# Patient Record
Sex: MALE | NOT HISPANIC OR LATINO | Employment: FULL TIME | URBAN - METROPOLITAN AREA
[De-identification: names, ages, dates, MRNs, and addresses within clinical notes are randomized per-mention and may not be internally consistent; named-entity substitution may affect disease eponyms.]

---

## 2017-05-09 ENCOUNTER — OFFICE VISIT (OUTPATIENT)
Dept: FAMILY MEDICINE | Facility: OTHER | Age: 22
End: 2017-05-09

## 2017-05-09 VITALS
RESPIRATION RATE: 16 BRPM | BODY MASS INDEX: 32.77 KG/M2 | HEART RATE: 80 BPM | HEIGHT: 68 IN | DIASTOLIC BLOOD PRESSURE: 74 MMHG | SYSTOLIC BLOOD PRESSURE: 122 MMHG | TEMPERATURE: 98.1 F | WEIGHT: 216.2 LBS

## 2017-05-09 DIAGNOSIS — L03.031 PARONYCHIA OF GREAT TOE OF RIGHT FOOT: ICD-10-CM

## 2017-05-09 DIAGNOSIS — J45.20 ASTHMA, ALLERGIC, MILD INTERMITTENT, UNCOMPLICATED: Primary | ICD-10-CM

## 2017-05-09 PROCEDURE — 99214 OFFICE O/P EST MOD 30 MIN: CPT | Performed by: STUDENT IN AN ORGANIZED HEALTH CARE EDUCATION/TRAINING PROGRAM

## 2017-05-09 RX ORDER — CEPHALEXIN 500 MG/1
500 CAPSULE ORAL 3 TIMES DAILY
Qty: 21 CAPSULE | Refills: 0 | Status: SHIPPED | OUTPATIENT
Start: 2017-05-09 | End: 2017-05-16

## 2017-05-09 RX ORDER — ALBUTEROL SULFATE 90 UG/1
2 AEROSOL, METERED RESPIRATORY (INHALATION)
COMMUNITY
Start: 2015-10-21 | End: 2017-05-09

## 2017-05-09 RX ORDER — ALBUTEROL SULFATE 90 UG/1
2 AEROSOL, METERED RESPIRATORY (INHALATION) EVERY 6 HOURS PRN
Qty: 1 INHALER | Refills: 11 | Status: SHIPPED | OUTPATIENT
Start: 2017-05-09 | End: 2018-03-01

## 2017-05-09 NOTE — NURSING NOTE
"Chief Complaint   Patient presents with     Toenail     Asthma       Initial /74  Pulse 80  Temp 98.1  F (36.7  C) (Temporal)  Resp 16  Ht 5' 8\" (1.727 m)  Wt 216 lb 3.2 oz (98.1 kg)  BMI 32.87 kg/m2 Estimated body mass index is 32.87 kg/(m^2) as calculated from the following:    Height as of this encounter: 5' 8\" (1.727 m).    Weight as of this encounter: 216 lb 3.2 oz (98.1 kg).  Medication Reconciliation: complete   Danette Mendoza, SOLANGE    "

## 2017-05-09 NOTE — PROGRESS NOTES
"  SUBJECTIVE:                                                    Drake Wolff is a 21 year old male who presents to clinic today for the following health issues:    HPI    Concern - toenail     Onset: 2 months    Description:   Infection in right big toe  Left second toe was tender and had redness and swelling but this has now resolved.    Intensity: mild, moderate    Progression of Symptoms:  worsening    Accompanying Signs & Symptoms:  Puss, redness       Previous history of similar problem:   yes    Precipitating factors:   Worsened by: none    Alleviating factors:  Improved by: none       Therapies Tried and outcome: peroxide    Asthma Follow-Up    Was ACT completed today?    Yes    ACT Total Scores 5/9/2017   ACT TOTAL SCORE (Goal Greater than or Equal to 20) 20   In the past 12 months, how many times did you visit the emergency room for your asthma without being admitted to the hospital? 0   In the past 12 months, how many times were you hospitalized overnight because of your asthma? 0       Recent asthma triggers that patient is dealing with: dust mites, pollens and exercise or sports        Problem list and histories reviewed & adjusted, as indicated.  Additional history: as documented    ROS:  Constitutional, HEENT, cardiovascular, pulmonary, gi and gu systems are negative, except as otherwise noted.    OBJECTIVE:                                                    /74  Pulse 80  Temp 98.1  F (36.7  C) (Temporal)  Resp 16  Ht 5' 8\" (1.727 m)  Wt 216 lb 3.2 oz (98.1 kg)  BMI 32.87 kg/m2  Body mass index is 32.87 kg/(m^2).  GENERAL: healthy, alert and no distress  NECK: no adenopathy, no asymmetry, masses, or scars and thyroid normal to palpation  RESP: lungs clear to auscultation - no rales, rhonchi or wheezes  CV: regular rate and rhythm, normal S1 S2, no S3 or S4, no murmur, click or rub  MS: no gross musculoskeletal defects noted  SKIN: right great medial nail fold with erythema, mild " edema, tenderness to palpation, mild yellow crusting  NEURO: Normal strength and tone, mentation intact and speech normal  PSYCH: mentation appears normal, affect normal/bright    Diagnostic Test Results:  none      ASSESSMENT/PLAN:                                                      1. Asthma, allergic, mild intermittent, uncomplicated  Allergy induced asthma that flares seasonally. Asthma symptoms well-controlled with albuterol PRN.  Return to clinic as needed if experiences exacerbation.  ACT Total Scores 5/9/2017   ACT TOTAL SCORE (Goal Greater than or Equal to 20) 20   In the past 12 months, how many times did you visit the emergency room for your asthma without being admitted to the hospital? 0   In the past 12 months, how many times were you hospitalized overnight because of your asthma? 0     - albuterol (PROAIR HFA/PROVENTIL HFA/VENTOLIN HFA) 108 (90 BASE) MCG/ACT Inhaler; Inhale 2 puffs into the lungs every 6 hours as needed for shortness of breath / dyspnea or wheezing  Dispense: 1 Inhaler; Refill: 11    2. Paronychia of great toe of right foot  Antibiotic to treat paronychia. Discussed instructions for soaking toe and easing up the edge of the toenail with rolled cotton.  Podiatry consult for removal of ingrown toenail if conservative treatment does not relieve symptoms.  Soak foot every night in warm, soapy water    Soak the painful toe in warm water twice a day for 10 to 20 minutes each time. Wash the entire foot with an antibacterial soap.    If there is redness or swelling around the toenail, apply an antibiotic ointment three times a day.    Insert a small piece of rolled-up cotton under the corner of the nail to promote growth of the nail outward, away from the cuticle.    Wear shoes that do not put pressure on the toes, such as a sandal or open shoe. Closed shoes should be big enough in the toes so that there is no pressure on the painful toe.  You may use acetaminophen or ibuprofen for pain,  unless another pain medicine was prescribed. Talk with your healthcare provider before using these medicines if you have chronic liver or kidney disease. Also tell your healthcare provider if you have ever had a stomach ulcer or GI bleeding.c.    - cephALEXin (KEFLEX) 500 MG capsule; Take 1 capsule (500 mg) by mouth 3 times daily for 7 days  Dispense: 21 capsule; Refill: 0    DEVANTE Galdamez [facility]

## 2017-05-09 NOTE — MR AVS SNAPSHOT
After Visit Summary   5/9/2017    Drake Wolff    MRN: [id number]           Patient Information     Date Of Birth          1995        Visit Information        Provider Department      5/9/2017 2:40 PM Gladys Kelly APRN [facility]        Today's Diagnoses     Paronychia of great toe of right foot    -  1      Care Instructions    Keflex three times daily for 7 days.  Soak foot every night in warm, soapy water    Soak the painful toe in warm water twice a day for 10 to 20 minutes each time. Wash the entire foot with an antibacterial soap.    If there is redness or swelling around the toenail, apply an antibiotic ointment three times a day.    Insert a small piece of rolled-up cotton under the corner of the nail to promote growth of the nail outward, away from the cuticle.    Wear shoes that do not put pressure on the toes, such as a sandal or open shoe. Closed shoes should be big enough in the toes so that there is no pressure on the painful toe.    You may use acetaminophen or ibuprofen for pain, unless another pain medicine was prescribed. Talk with your healthcare provider before using these medicines if you have chronic liver or kidney disease. Also tell your healthcare provider if you have ever had a stomach ulcer or GI bleeding.  Gladys Kelly, NP-C  [phone]  Ingrown Toenail, Infected (Antibiotics, No Excision)  An ingrown toenail occurs when the nail grows sideways into the skin alongside the nail. This can cause pain. It can also lead to an infection with redness, swelling and sometimes drainage.  Cause  The most common cause of an ingrown toenail is trimming your nails wrong. Most people trim the nails too close to the skin and try to round the nail too tightly around the shape of the toe. When you do this, the nail can grow into the skin of your toe. While it may look nice, your toenail can grow into the skin and cause an infection.  Other  causes include injury or wearing shoes that are too short or tight. This can cause the same problem that happens when trimming your nails. Your genetics can also make this more likely to happen.  Symptoms  The following are the most common symptoms of an ingrown toenail:     Pain    Redness    Swelling    Drainage  Treatment  The best thing to do for an ingrown toenail is treat it as soon as you see there is a problem. The longer you wait to do something, the worse it is likely to get. Sometimes it gets worse quickly, other times it may take awhile. It can even feel better for a while, and then get worse.  Inflammation  If the infection is mild, you may be able to take care of it at home with the following measures:    Frequent warm water soaks    Keeping it clean    Wearing loose, comfortable shoes or sandals  Another method involves using a small piece of cotton or waxed dental floss to gently lift up the corner of the problem nail. Change the cotton or floss frequently, especially if it gets dirty.  Infection  If your infection is mild, and home care isn't working, or if the infection gets worse, see your health care provider. Signs of worsening infection include:    Swelling    Redness    Pus drainage  In some cases, you may need antibiotics along with warm soaks. If after 2 to 3 days of antibiotic  the toenail doesn't get better or gets worse, part of the nail may need to be removed to drain the infection. With treatment, it can take 1 to 2 weeks to clear up completely.  Home care  Wound care  For the next 3 days, soak and clean your toe in warm water a few times a day.  1) Twice a day for the first 3 days, clean and soak the toe as follows:    Soak your foot in a tub of warm water for 5 minutes. Or, hold your toe under a faucet of warm running water for 5 minutes.    Clean any remaining crust away with soap and water using a cotton swab.    Put a small amount of antibiotic ointment on the infected area.  2)  Change the dressing or bandage every time you soak or clean it, or whenever it becomes wet or dirty.  3) If you were prescribed antibiotics, take them as directed until they are all gone.  4) Wear comfortable shoes with a lot of toe room, or open-toe sandals, while your toe is healing.  Medications    You can take acetaminophen or ibuprofen for pain, unless you were given a different pain medicine to use. If you have chronic liver or kidney disease, ever had a stomach ulcer or gastrointestinal bleeding, or are taking blood thinner medications, talk with your doctor before using these medicines.    If you were given antibiotics, take them until they are used up or your doctor tells you to stop, even if the wound looks better.  Prevention  To prevent ingrown toenails:  1) Wear shoes that fit well. Avoid shoes that pinch the toes together.  2) When you trim your toenails, do not cut them too short. Cut straight across at the top and do not round the edges.  3) Do not use a sharp object to clean under your nail since this might cause an infection.  4) If the toenail starts to grow into the skin again, put a small piece of waxed dental floss or cotton under that side of the nail to help it grow out straight.  Follow-up care  Follow up with your doctor or this facility as advised by our staff.  When to seek medical care  Get prompt medical attention if any of the following occur:    Increasing redness, pain or swelling of the toe    Red streaks in the skin leading away from the wound    Pus or fluid drainage    Fever of 100.4  F (38  C) or higher, or as directed by your health care provider    0787-6727 [employer]. [address] [place] [address], [place] [address]. All rights reserved. This information is not intended as a substitute for professional medical care. Always follow your healthcare professional's instructions.                     Follow-ups after your visit        Who to contact     If you have  "questions or need follow up information about today's clinic visit or your schedule please contact [facility] directly at [phone].  Normal or non-critical lab and imaging results will be communicated to you by MyChart, letter or phone within 4 business days after the clinic has received the results. If you do not hear from us within 7 days, please contact the clinic through MyChart or phone. If you have a critical or abnormal lab result, we will notify you by phone as soon as possible.  Submit refill requests through [employer] or call your pharmacy and they will forward the refill request to us. Please allow 3 business days for your refill to be completed.          Additional Information About Your Visit        [employer] Information     [employer] lets you send messages to your doctor, view your test results, renew your prescriptions, schedule appointments and more. To sign up, go to [email or web address] . Click on \"Log in\" on the left side of the screen, which will take you to the Welcome page. Then click on \"Sign up Now\" on the right side of the page.     You will be asked to enter the access code listed below, as well as some personal information. Please follow the directions to create your username and password.     Your access code is: [email or web address]  Expires: 2017  3:26 PM     Your access code will  in 90 days. If you need help or a new code, please call your [place] clinic or [phone].        Care EveryWhere ID     This is your Care EveryWhere ID. This could be used by other organizations to access your [place] medical records  [id number]        Your Vitals Were     Pulse Temperature Respirations Height BMI (Body Mass Index)       80 98.1  F (36.7  C) (Temporal) 16 5' 8\" (1.727 m) 32.87 kg/m2        Blood Pressure from Last 3 Encounters:   17 122/74    Weight from Last 3 Encounters:   17 216 lb 3.2 oz (98.1 kg)              Today, you had the following     No " orders found for display         Today's Medication Changes          These changes are accurate as of: 5/9/17  3:27 PM.  If you have any questions, ask your nurse or doctor.               Start taking these medicines.        Dose/Directions    cephALEXin 500 MG capsule   Commonly known as:  KEFLEX   Used for:  Paronychia of great toe of right foot   Started by:  Gladys Kelly APRN CNP        Dose:  500 mg   Take 1 capsule (500 mg) by mouth 3 times daily for 7 days   Quantity:  21 capsule   Refills:  0         These medicines have changed or have updated prescriptions.        Dose/Directions    albuterol 108 (90 BASE) MCG/ACT Inhaler   Commonly known as:  PROAIR HFA/PROVENTIL HFA/VENTOLIN HFA   This may have changed:    - when to take this  - reasons to take this   Used for:  Paronychia of great toe of right foot   Changed by:  Gladys Kelly APRN CNP        Dose:  2 puff   Inhale 2 puffs into the lungs every 6 hours as needed for shortness of breath / dyspnea or wheezing   Quantity:  1 Inhaler   Refills:  11            Where to get your medicines      These medications were sent to [id number] - [place], [place] - [address] [place] [id number]     Phone:  [phone]     albuterol 108 (90 BASE) MCG/ACT Inhaler    cephALEXin 500 MG capsule                Primary Care Provider Office Phone # Fax #    DEVANTE Emerson [phone]       [address]  [facility] [address]        Thank you!     Thank you for choosing [facility]  for your care. Our goal is always to provide you with excellent care. Hearing back from our patients is one way we can continue to improve our services. Please take a few minutes to complete the written survey that you may receive in the mail after your visit with us. Thank you!             Your Updated Medication List - Protect others around you: Learn  how to safely use, store and throw away your medicines at www.disposemymeds.org.          This list is accurate as of: 5/9/17  3:27 PM.  Always use your most recent med list.                   Brand Name Dispense Instructions for use    albuterol 108 (90 BASE) MCG/ACT Inhaler    PROAIR HFA/PROVENTIL HFA/VENTOLIN HFA    1 Inhaler    Inhale 2 puffs into the lungs every 6 hours as needed for shortness of breath / dyspnea or wheezing       cephALEXin 500 MG capsule    KEFLEX    21 capsule    Take 1 capsule (500 mg) by mouth 3 times daily for 7 days       ZYRTEC-D PO

## 2017-05-09 NOTE — PATIENT INSTRUCTIONS
Keflex three times daily for 7 days.  Soak foot every night in warm, soapy water    Soak the painful toe in warm water twice a day for 10 to 20 minutes each time. Wash the entire foot with an antibacterial soap.    If there is redness or swelling around the toenail, apply an antibiotic ointment three times a day.    Insert a small piece of rolled-up cotton under the corner of the nail to promote growth of the nail outward, away from the cuticle.    Wear shoes that do not put pressure on the toes, such as a sandal or open shoe. Closed shoes should be big enough in the toes so that there is no pressure on the painful toe.    You may use acetaminophen or ibuprofen for pain, unless another pain medicine was prescribed. Talk with your healthcare provider before using these medicines if you have chronic liver or kidney disease. Also tell your healthcare provider if you have ever had a stomach ulcer or GI bleeding.  Gladys Kelly, NP-C  [phone]  Ingrown Toenail, Infected (Antibiotics, No Excision)  An ingrown toenail occurs when the nail grows sideways into the skin alongside the nail. This can cause pain. It can also lead to an infection with redness, swelling and sometimes drainage.  Cause  The most common cause of an ingrown toenail is trimming your nails wrong. Most people trim the nails too close to the skin and try to round the nail too tightly around the shape of the toe. When you do this, the nail can grow into the skin of your toe. While it may look nice, your toenail can grow into the skin and cause an infection.  Other causes include injury or wearing shoes that are too short or tight. This can cause the same problem that happens when trimming your nails. Your genetics can also make this more likely to happen.  Symptoms  The following are the most common symptoms of an ingrown toenail:     Pain    Redness    Swelling    Drainage  Treatment  The best thing to do for an ingrown toenail is treat it as soon  as you see there is a problem. The longer you wait to do something, the worse it is likely to get. Sometimes it gets worse quickly, other times it may take awhile. It can even feel better for a while, and then get worse.  Inflammation  If the infection is mild, you may be able to take care of it at home with the following measures:    Frequent warm water soaks    Keeping it clean    Wearing loose, comfortable shoes or sandals  Another method involves using a small piece of cotton or waxed dental floss to gently lift up the corner of the problem nail. Change the cotton or floss frequently, especially if it gets dirty.  Infection  If your infection is mild, and home care isn't working, or if the infection gets worse, see your health care provider. Signs of worsening infection include:    Swelling    Redness    Pus drainage  In some cases, you may need antibiotics along with warm soaks. If after 2 to 3 days of antibiotic  the toenail doesn't get better or gets worse, part of the nail may need to be removed to drain the infection. With treatment, it can take 1 to 2 weeks to clear up completely.  Home care  Wound care  For the next 3 days, soak and clean your toe in warm water a few times a day.  1) Twice a day for the first 3 days, clean and soak the toe as follows:    Soak your foot in a tub of warm water for 5 minutes. Or, hold your toe under a faucet of warm running water for 5 minutes.    Clean any remaining crust away with soap and water using a cotton swab.    Put a small amount of antibiotic ointment on the infected area.  2) Change the dressing or bandage every time you soak or clean it, or whenever it becomes wet or dirty.  3) If you were prescribed antibiotics, take them as directed until they are all gone.  4) Wear comfortable shoes with a lot of toe room, or open-toe sandals, while your toe is healing.  Medications    You can take acetaminophen or ibuprofen for pain, unless you were given a different pain  medicine to use. If you have chronic liver or kidney disease, ever had a stomach ulcer or gastrointestinal bleeding, or are taking blood thinner medications, talk with your doctor before using these medicines.    If you were given antibiotics, take them until they are used up or your doctor tells you to stop, even if the wound looks better.  Prevention  To prevent ingrown toenails:  1) Wear shoes that fit well. Avoid shoes that pinch the toes together.  2) When you trim your toenails, do not cut them too short. Cut straight across at the top and do not round the edges.  3) Do not use a sharp object to clean under your nail since this might cause an infection.  4) If the toenail starts to grow into the skin again, put a small piece of waxed dental floss or cotton under that side of the nail to help it grow out straight.  Follow-up care  Follow up with your doctor or this facility as advised by our staff.  When to seek medical care  Get prompt medical attention if any of the following occur:    Increasing redness, pain or swelling of the toe    Red streaks in the skin leading away from the wound    Pus or fluid drainage    Fever of 100.4  F (38  C) or higher, or as directed by your health care provider    [id number] [employer]. [address] [place] [address], [place] [address]. All rights reserved. This information is not intended as a substitute for professional medical care. Always follow your healthcare professional's instructions.

## 2017-05-10 ASSESSMENT — ASTHMA QUESTIONNAIRES: ACT_TOTALSCORE: 20

## 2017-05-13 PROBLEM — J45.20 ASTHMA, ALLERGIC, MILD INTERMITTENT, UNCOMPLICATED: Status: ACTIVE | Noted: 2017-05-13

## 2017-06-01 ENCOUNTER — TELEPHONE (OUTPATIENT)
Dept: FAMILY MEDICINE | Facility: OTHER | Age: 22
End: 2017-06-01

## 2017-06-01 NOTE — TELEPHONE ENCOUNTER
Pt wants his rx for Albuteral transferred from the [facility] pharmacy in [place] to be sent to the [facility] in [place]. Due to insurance coverage.

## 2017-06-10 ENCOUNTER — HEALTH MAINTENANCE LETTER (OUTPATIENT)
Age: 22
End: 2017-06-10

## 2018-03-01 ENCOUNTER — OFFICE VISIT (OUTPATIENT)
Dept: FAMILY MEDICINE | Facility: CLINIC | Age: 23
End: 2018-03-01
Payer: COMMERCIAL

## 2018-03-01 VITALS
OXYGEN SATURATION: 99 % | HEIGHT: 68 IN | HEART RATE: 80 BPM | WEIGHT: 185 LBS | DIASTOLIC BLOOD PRESSURE: 64 MMHG | BODY MASS INDEX: 28.04 KG/M2 | TEMPERATURE: 98.3 F | SYSTOLIC BLOOD PRESSURE: 109 MMHG

## 2018-03-01 DIAGNOSIS — J45.20 ASTHMA, ALLERGIC, MILD INTERMITTENT, UNCOMPLICATED: Primary | ICD-10-CM

## 2018-03-01 PROCEDURE — 99213 OFFICE O/P EST LOW 20 MIN: CPT | Performed by: NURSE PRACTITIONER

## 2018-03-01 RX ORDER — ALBUTEROL SULFATE 90 UG/1
2 AEROSOL, METERED RESPIRATORY (INHALATION) EVERY 6 HOURS PRN
Qty: 1 INHALER | Refills: 11 | Status: SHIPPED | OUTPATIENT
Start: 2018-03-01

## 2018-03-01 NOTE — MR AVS SNAPSHOT
"              After Visit Summary   3/1/2018    Drake Wolff    MRN: [id number]           Patient Information     Date Of Birth          1995        Visit Information        Provider Department      3/1/2018 1:30 PM Georgie Gomes APRN CNP [facility] [place]        Today's Diagnoses     Asthma, allergic, mild intermittent, uncomplicated    -  1       Follow-ups after your visit        Who to contact     If you have questions or need follow up information about today's clinic visit or your schedule please contact [facility] directly at [phone].  Normal or non-critical lab and imaging results will be communicated to you by MyChart, letter or phone within 4 business days after the clinic has received the results. If you do not hear from us within 7 days, please contact the clinic through MyChart or phone. If you have a critical or abnormal lab result, we will notify you by phone as soon as possible.  Submit refill requests through [employer] or call your pharmacy and they will forward the refill request to us. Please allow 3 business days for your refill to be completed.          Additional Information About Your Visit        Care EveryWhere ID     This is your Care EveryWhere ID. This could be used by other organizations to access your [facility] medical records  [id number]        Your Vitals Were     Pulse Temperature Height Pulse Oximetry BMI (Body Mass Index)       80 98.3  F (36.8  C) (Oral) 5' 8\" (1.727 m) 99% 28.13 kg/m2        Blood Pressure from Last 3 Encounters:   03/01/18 109/64   05/09/17 122/74    Weight from Last 3 Encounters:   03/01/18 185 lb (83.9 kg)   05/09/17 216 lb 3.2 oz (98.1 kg)              Today, you had the following     No orders found for display         Where to get your medicines      These medications were sent to [facility] [place], [place] - [address], [address]  [address], [address], [place] [address]     Phone:  " [phone]     albuterol 108 (90 BASE) MCG/ACT Inhaler          Primary Care Provider Office Phone # Fax #    DEVANTE [facility] [phone] [address] [address] [place] [address]        Equal Access to Services     YOEL BLACKWOOD : Hadii aad ku hadasho Soomaali, waaxda luqadaha, qaybta kaalmada adeegyada, waxay idiin hayaan adeeg kharash la'aan . So [facility] [phone].    ATENCIÓN: Si habla español, tiene a lerma disposición servicios gratuitos de asistencia lingüística. Llame al [phone].    We comply with applicable federal civil rights laws and [place] laws. We do not discriminate on the basis of race, color, national origin, age, disability, sex, sexual orientation, or gender identity.            Thank you!     Thank you for choosing [facility]  for your care. Our goal is always to provide you with excellent care. Hearing back from our patients is one way we can continue to improve our services. Please take a few minutes to complete the written survey that you may receive in the mail after your visit with us. Thank you!             Your Updated Medication List - Protect others around you: Learn how to safely use, store and throw away your medicines at www.disposemymeds.org.          This list is accurate as of 3/1/18  3:02 PM.  Always use your most recent med list.                   Brand Name Dispense Instructions for use Diagnosis    albuterol 108 (90 BASE) MCG/ACT Inhaler    PROAIR HFA/PROVENTIL HFA/VENTOLIN HFA    1 Inhaler    Inhale 2 puffs into the lungs every 6 hours as needed for shortness of breath / dyspnea or wheezing    Asthma, allergic, mild intermittent, uncomplicated

## 2018-03-01 NOTE — PROGRESS NOTES
"HPI      SUBJECTIVE:   Drake Wolff is a 22 year old male who presents to clinic today for the following health issues:      Asthma Follow-Up    Was ACT completed today?    Yes    ACT Total Scores 3/1/2018   ACT TOTAL SCORE (Goal Greater than or Equal to 20) 14   In the past 12 months, how many times did you visit the emergency room for your asthma without being admitted to the hospital? 3   In the past 12 months, how many times were you hospitalized overnight because of your asthma? 0       Recent asthma triggers that patient is dealing with: cold air      Requesting albuterol   Using inhaler 2-3 times a week  No specific provoking factors   4 ED visits in the past year for asthma. Just got 1 tx at each visit   Quit smoking 3 years ago   No cough, SOB currently   Just wants to have an inhaler on hand when he needs it      No past medical history on file.  No past surgical history on file.  Social History   Substance Use Topics     Smoking status: Former Smoker     Quit date: 1/1/2016     Smokeless tobacco: Never Used     Alcohol use Yes      Comment: occ     Current Outpatient Prescriptions   Medication Sig Dispense Refill     albuterol (PROAIR HFA/PROVENTIL HFA/VENTOLIN HFA) 108 (90 BASE) MCG/ACT Inhaler Inhale 2 puffs into the lungs every 6 hours as needed for shortness of breath / dyspnea or wheezing 1 Inhaler 11     [DISCONTINUED] albuterol (PROAIR HFA/PROVENTIL HFA/VENTOLIN HFA) 108 (90 BASE) MCG/ACT Inhaler Inhale 2 puffs into the lungs every 6 hours as needed for shortness of breath / dyspnea or wheezing 1 Inhaler 11     Allergies   Allergen Reactions     No Known Allergies        Reviewed and updated as needed this visit by clinical staff and provider      ROS  Detailed as above       /64 (BP Location: Right arm, Cuff Size: Adult Large)  Pulse 80  Temp 98.3  F (36.8  C) (Oral)  Ht 5' 8\" (1.727 m)  Wt 185 lb (83.9 kg)  SpO2 99%  BMI 28.13 kg/m2    Physical Exam   Constitutional: He is " well-developed, well-nourished, and in no distress.   HENT:   Head: Normocephalic.   Eyes: Conjunctivae are normal.   Pulmonary/Chest: Effort normal.   Neurological: He is alert.   Psychiatric: Mood and affect normal.   Vitals reviewed.      Assessment and Plan:       ICD-10-CM    1. Asthma, allergic, mild intermittent, uncomplicated J45.20 albuterol (PROAIR HFA/PROVENTIL HFA/VENTOLIN HFA) 108 (90 BASE) MCG/ACT Inhaler       Intermittent asthma. Here only to request a refill on his inhaler. Doesn't feel he needs it currently. Strongly recommended establishing with PCP as he may need more maintenance. Pt agreed       DEVANTE Vega, CNP  [facility]

## 2018-03-01 NOTE — NURSING NOTE
"Chief Complaint   Patient presents with     Asthma     f/u       Initial /64 (BP Location: Right arm, Cuff Size: Adult Large)  Pulse 80  Temp 98.3  F (36.8  C) (Oral)  Ht 5' 8\" (1.727 m)  Wt 185 lb (83.9 kg)  SpO2 99%  BMI 28.13 kg/m2 Estimated body mass index is 28.13 kg/(m^2) as calculated from the following:    Height as of this encounter: 5' 8\" (1.727 m).    Weight as of this encounter: 185 lb (83.9 kg).  Medication Reconciliation: complete       Yojana Alejandra CMA      "

## 2018-03-02 ASSESSMENT — ASTHMA QUESTIONNAIRES: ACT_TOTALSCORE: 14

## 2018-03-03 ENCOUNTER — HEALTH MAINTENANCE LETTER (OUTPATIENT)
Age: 23
End: 2018-03-03

## 2018-04-11 ENCOUNTER — APPOINTMENT (OUTPATIENT)
Dept: GENERAL RADIOLOGY | Facility: CLINIC | Age: 23
End: 2018-04-11
Attending: EMERGENCY MEDICINE
Payer: COMMERCIAL

## 2018-04-11 ENCOUNTER — HOSPITAL ENCOUNTER (EMERGENCY)
Facility: CLINIC | Age: 23
Discharge: HOME | End: 2018-04-11
Attending: EMERGENCY MEDICINE | Admitting: EMERGENCY MEDICINE
Payer: COMMERCIAL

## 2018-04-11 VITALS
RESPIRATION RATE: 22 BRPM | TEMPERATURE: 99.1 F | OXYGEN SATURATION: 96 % | DIASTOLIC BLOOD PRESSURE: 82 MMHG | HEART RATE: 114 BPM | SYSTOLIC BLOOD PRESSURE: 134 MMHG

## 2018-04-11 DIAGNOSIS — J45.901 MODERATE ASTHMA WITH EXACERBATION, UNSPECIFIED WHETHER PERSISTENT: ICD-10-CM

## 2018-04-11 LAB — INTERPRETATION ECG - MUSE: NORMAL

## 2018-04-11 PROCEDURE — 71046 X-RAY EXAM CHEST 2 VIEWS: CPT

## 2018-04-11 PROCEDURE — 93005 ELECTROCARDIOGRAM TRACING: CPT

## 2018-04-11 PROCEDURE — 25000125 ZZHC RX 250: Performed by: EMERGENCY MEDICINE

## 2018-04-11 PROCEDURE — 99284 EMERGENCY DEPT VISIT MOD MDM: CPT | Mod: 25

## 2018-04-11 PROCEDURE — 94640 AIRWAY INHALATION TREATMENT: CPT

## 2018-04-11 RX ORDER — ALBUTEROL SULFATE 0.83 MG/ML
5 SOLUTION RESPIRATORY (INHALATION) ONCE
Status: COMPLETED | OUTPATIENT
Start: 2018-04-11 | End: 2018-04-11

## 2018-04-11 RX ORDER — ALBUTEROL SULFATE 0.83 MG/ML
1 SOLUTION RESPIRATORY (INHALATION) EVERY 6 HOURS PRN
Qty: 75 ML | Refills: 0 | Status: SHIPPED | OUTPATIENT
Start: 2018-04-11

## 2018-04-11 RX ORDER — PREDNISONE 20 MG/1
TABLET ORAL
Qty: 10 TABLET | Refills: 0 | Status: SHIPPED | OUTPATIENT
Start: 2018-04-11 | End: 2021-10-11

## 2018-04-11 RX ADMIN — ALBUTEROL SULFATE 5 MG: 2.5 SOLUTION RESPIRATORY (INHALATION) at 08:55

## 2018-04-11 ASSESSMENT — ENCOUNTER SYMPTOMS
SHORTNESS OF BREATH: 1
CHEST TIGHTNESS: 1
WHEEZING: 1
FEVER: 0
COUGH: 1

## 2018-04-11 NOTE — ED AVS SNAPSHOT
Emergency Department    [address] [facility] [address]    Phone:  [phone]    Fax:  [phone]                                       Drake Wolff   MRN: [id number]    Department:   Emergency Department   Date of Visit:  4/11/2018           After Visit Summary Signature Page     I have received my discharge instructions, and my questions have been answered. I have discussed any challenges I see with this plan with the nurse or doctor.    ..........................................................................................................................................  Patient/Patient Representative Signature      ..........................................................................................................................................  Patient Representative Print Name and Relationship to Patient    ..................................................               ................................................  Date                                            Time    ..........................................................................................................................................  Reviewed by Signature/Title    ...................................................              ..............................................  Date                                                            Time

## 2018-04-11 NOTE — ED AVS SNAPSHOT
Emergency Department    [address] [place] [address]    Phone:  [phone]    Fax:  [phone]                                       Drake Wolff   MRN: [id number]    Department:   Emergency Department   Date of Visit:  4/11/2018           Patient Information     Date Of Birth          1995        Your diagnoses for this visit were:     Moderate asthma with exacerbation, unspecified whether persistent        You were seen by Randell Mckeon MD.      Follow-up Information     Please follow up.    Why:  use the nebulized albuterol or your inhaler - take the Prednisone - return if any concerns        Discharge Instructions       Discharge Instructions  Asthma    Asthma is a condition causing narrowing and inflammation of the airways that can make it hard to breathe.  Asthma can also cause cough, wheezing, noisy breathing and tightness in the chest.  Asthma can be brought on or  triggered  by many things, including dust, mold, pollen, cigarette smoke, exercise, stress and infections (like the common cold).     Generally, every Emergency Department visit should have a follow-up clinic visit with either a primary or a specialty clinic/provider. Please follow-up as instructed by your emergency provider today.    Return to the Emergency Department if:    Your breathing gets worse.    You need to use your inhaler more often than every 4 hours, or cannot get relief from your inhaler.    You are very weak, or feel much more ill.    You develop new symptoms, such as chest pain.    You cough up blood.    You are vomiting (throwing up) enough that you cannot keep fluids or your medicine down.    What can I do to help myself?    Fill any prescriptions the provider gave you and take them right away--especially antibiotics. Be sure to finish the whole antibiotic prescription.    You may be given a prescription for an inhaler, which can help loosen tight air passages.  Use this as needed, but  not more often than directed. Inhalers work much better when used with a spacer.     You may be given a prescription for a steroid to reduce inflammation. Used long-term, these can have some side effects, but for short-term use they are safe. You may notice restlessness or increased appetite (eating more).        You may use non-prescription cough or cold medicines. Cough medicines may help, but do not make the cough go away completely.     Avoid smoke, because this can make you feel worse. If you smoke, this may be a good time to quit! Consider using nicotine lozenges, gum, or patches to reduce cravings.     If you have a fever, Tylenol  (acetaminophen), Motrin  (ibuprofen), or Advil  (ibuprofen), may help bring fever down and may help you feel more comfortable. Be sure to read and follow the package directions, and ask your provider if you have questions.    Be sure to get your flu shot each year.  For certain age groups, the pneumonia shot can help prevent pneumonia.  It is important that you follow up with your regular provider, to be sure that you are improving from this spell (an acute asthma exacerbation), and also to do what you can to keep from having trouble again. Sometimes you need long-term medicines to keep your asthma under control.   If you were given a prescription for medicine here today, be sure to read all of the information (including the package insert) that comes with your prescription.  This will include important information about the medicine, its side effects, and any warnings that you need to know about.  The pharmacist who fills the prescription can provide more information and answer questions you may have about the medicine.  If you have questions or concerns that the pharmacist cannot address, please call or return to the Emergency Department.   Remember that you can always come back to the Emergency Department if you are not able to see your regular provider in the amount of time  listed above, if you get any new symptoms, or if there is anything that worries you.      24 Hour Appointment Hotline       To make an appointment at any [facility], call [phone] ([phone]). If you don't have a family doctor or clinic, we will help you find one. [facility] clinics are conveniently located to serve the needs of you and your family.             Review of your medicines      START taking        Dose / Directions Last dose taken    predniSONE 20 MG tablet   Commonly known as:  DELTASONE   Quantity:  10 tablet        Take two tablets (= 40mg) each day for 5 (five) days   Refills:  0          CONTINUE these medicines which may have CHANGED, or have new prescriptions. If we are uncertain of the size of tablets/capsules you have at home, strength may be listed as something that might have changed.        Dose / Directions Last dose taken    * albuterol 108 (90 Base) MCG/ACT Inhaler   Commonly known as:  PROAIR HFA/PROVENTIL HFA/VENTOLIN HFA   Dose:  2 puff   What changed:  Another medication with the same name was added. Make sure you understand how and when to take each.   Quantity:  1 Inhaler        Inhale 2 puffs into the lungs every 6 hours as needed for shortness of breath / dyspnea or wheezing   Refills:  11        * albuterol (2.5 MG/3ML) 0.083% neb solution   Dose:  1 vial   What changed:  You were already taking a medication with the same name, and this prescription was added. Make sure you understand how and when to take each.   Quantity:  75 mL        Take 1 vial (2.5 mg) by nebulization every 6 hours as needed for shortness of breath / dyspnea or wheezing   Refills:  0        * Notice:  This list has 2 medication(s) that are the same as other medications prescribed for you. Read the directions carefully, and ask your doctor or other care provider to review them with you.            Prescriptions were sent or printed at these locations (2 Prescriptions)                   [id number] - [place], [place] - [address]   [address], [place] [address]    Telephone:  [phone]   Fax:  [phone]   Hours:                  E-Prescribed (2 of 2)         albuterol (2.5 MG/3ML) 0.083% neb solution               predniSONE (DELTASONE) 20 MG tablet                Procedures and tests performed during your visit     Chest XR,  PA & LAT    EKG 12 lead      Orders Needing Specimen Collection     None      Pending Results     No orders found from 4/9/2018 to 4/12/2018.            Pending Culture Results     No orders found from 4/9/2018 to 4/12/2018.            Pending Results Instructions     If you had any lab results that were not finalized at the time of your Discharge, you can call the ED Lab Result RN at [phone]. You will be contacted by this team for any positive Lab results or changes in treatment. The nurses are available 7 days a week from 10A to 6:30P.  You can leave a message 24 hours per day and they will return your call.        Test Results From Your Hospital Stay        4/11/2018  9:44 AM      Narrative     CHEST TWO VIEWS  4/11/2018 9:39 AM     HISTORY: Left chest pain.    COMPARISON: None.        Impression     IMPRESSION: Normal.    BETTY MORA MD                Clinical Quality Measure: Blood Pressure Screening     Your blood pressure was checked while you were in the emergency department today. The last reading we obtained was  BP: 134/82 . Please read the guidelines below about what these numbers mean and what you should do about them.  If your systolic blood pressure (the top number) is less than 120 and your diastolic blood pressure (the bottom number) is less than 80, then your blood pressure is normal. There is nothing more that you need to do about it.  If your systolic blood pressure (the top number) is 120-139 or your diastolic blood pressure (the bottom number) is 80-89, your blood pressure may be higher than it should be. You should  have your blood pressure rechecked within a year by a primary care provider.  If your systolic blood pressure (the top number) is 140 or greater or your diastolic blood pressure (the bottom number) is 90 or greater, you may have high blood pressure. High blood pressure is treatable, but if left untreated over time it can put you at risk for heart attack, stroke, or kidney failure. You should have your blood pressure rechecked by a primary care provider within the next 4 weeks.  If your provider in the emergency department today gave you specific instructions to follow-up with your doctor or provider even sooner than that, you should follow that instruction and not wait for up to 4 weeks for your follow-up visit.        Thank you for choosing [facility]       Thank you for choosing [facility] for your care. Our goal is always to provide you with excellent care. Hearing back from our patients is one way we can continue to improve our services. Please take a few minutes to complete the written survey that you may receive in the mail after you visit with us. Thank you!        Care EveryWhere ID     This is your Care EveryWhere ID. This could be used by other organizations to access your [facility] medical records  [id number]        Equal Access to Services     YOEL BLACKWOOD : Hadyolie Monsalve, jacqueline wills, evan burch. So [facility] [phone].    ATENCIÓN: Si habla español, tiene a lerma disposición servicios gratuitos de asistencia lingüística. Joey al [phone].    We comply with applicable federal civil rights laws and [place] laws. We do not discriminate on the basis of race, color, national origin, age, disability, sex, sexual orientation, or gender identity.            After Visit Summary       This is your record. Keep this with you and show to your community pharmacist(s) and doctor(s) at your next visit.

## 2018-11-20 ENCOUNTER — TELEPHONE (OUTPATIENT)
Dept: FAMILY MEDICINE | Facility: OTHER | Age: 23
End: 2018-11-20

## 2018-11-20 NOTE — LETTER
[address]   [facility] [address]  Phone: [phone]  December 4, 2018      Drake Wolff  [address] [place] [address]      Dear Drake,    We care about your health and have reviewed your health plan including your medical conditions, medications, and lab results.  Based on this review, it is recommended that you follow up regarding the following health topic(s):  -Asthma    We recommend you take the following action(s):  -schedule a FOLLOWUP APPOINTMENT.   -Complete and return the attached ASTHMA CONTROL TEST.  If your total score is 19 or less or you have been to the ER or urgent care for your asthma, then please schedule an asthma followup appointment.     Please call us at the [facility] - [phone] (or use [employer]) to address the above recommendations.     Thank you for trusting [facility] and we appreciate the opportunity to serve you.  We look forward to supporting your healthcare needs in the future.    Healthy Regards,    Your Health Care Team  [facility]

## 2018-11-20 NOTE — TELEPHONE ENCOUNTER
Summary:    Patient is due/failing the following:   Establish care, Asthma follow up and ACT    Action needed:   Patient needs office visit for follow up. and Patient needs to do ACT.    Type of outreach:    Phone, left message for patient to call back.     Questions for provider review:    None                                                                                                                                    Delilah Christina       Chart routed to Care Team .      Panel Management Review      Patient has the following on his problem list:     Asthma review     ACT Total Scores 3/1/2018   ACT TOTAL SCORE (Goal Greater than or Equal to 20) 14   In the past 12 months, how many times did you visit the emergency room for your asthma without being admitted to the hospital? 3   In the past 12 months, how many times were you hospitalized overnight because of your asthma? 0      1. Is Asthma diagnosis on the Problem List? Yes    2. Is Asthma listed on Health Maintenance? Yes    3. Patient is due for:  ACT and AAP      Composite cancer screening  Chart review shows that this patient is due/due soon for the following None

## 2019-03-21 ENCOUNTER — TELEPHONE (OUTPATIENT)
Dept: FAMILY MEDICINE | Facility: OTHER | Age: 24
End: 2019-03-21

## 2019-03-21 NOTE — LETTER
[address]   [facility] [address]  Phone: [phone]  March 21, 2019      Drake Wolff  [address] [place] [address]      Dear Drake,    We care about your health and have reviewed your health plan including your medical conditions, medications, and lab results.  Based on this review, it is recommended that you follow up regarding the following health topic(s):  -Asthma  -Wellness (Physical) Visit     We recommend you take the following action(s):  -schedule a FOLLOWUP APPOINTMENT.   -Complete and return the attached ASTHMA CONTROL TEST.  If your total score is 19 or less or you have been to the ER or urgent care for your asthma, then please schedule an asthma followup appointment.     Please call us at the [facility] - [phone] (or use [employer]) to address the above recommendations.     Thank you for trusting [facility] and we appreciate the opportunity to serve you.  We look forward to supporting your healthcare needs in the future.    Healthy Regards,    Your Health Care Team  [facility]

## 2019-03-21 NOTE — TELEPHONE ENCOUNTER
Summary:    Patient is due/failing the following:   Establish care with PCP, ACT and PHYSICAL    Action needed:   Patient needs office visit for Physical and asthma follow up. and Patient needs to do ACT.    Type of outreach:    Sent letter.    Questions for provider review:    None                                                                                                                                    Delilah Vasquez       Chart routed to Care Team .          Panel Management Review      Patient has the following on his problem list:     Asthma review     ACT Total Scores 3/1/2018   ACT TOTAL SCORE (Goal Greater than or Equal to 20) 14   In the past 12 months, how many times did you visit the emergency room for your asthma without being admitted to the hospital? 3   In the past 12 months, how many times were you hospitalized overnight because of your asthma? 0      1. Is Asthma diagnosis on the Problem List? Yes    2. Is Asthma listed on Health Maintenance? Yes    3. Patient is due for:  ACT      Composite cancer screening  Chart review shows that this patient is due/due soon for the following None

## 2019-05-11 ENCOUNTER — HOSPITAL ENCOUNTER (EMERGENCY)
Facility: CLINIC | Age: 24
Discharge: HOME | End: 2019-05-11
Attending: EMERGENCY MEDICINE | Admitting: EMERGENCY MEDICINE

## 2019-05-11 VITALS
HEIGHT: 68 IN | TEMPERATURE: 98.2 F | BODY MASS INDEX: 28.04 KG/M2 | OXYGEN SATURATION: 99 % | DIASTOLIC BLOOD PRESSURE: 73 MMHG | WEIGHT: 185 LBS | RESPIRATION RATE: 16 BRPM | SYSTOLIC BLOOD PRESSURE: 127 MMHG | HEART RATE: 87 BPM

## 2019-05-11 DIAGNOSIS — M25.511 ACUTE PAIN OF RIGHT SHOULDER: ICD-10-CM

## 2019-05-11 PROCEDURE — 99282 EMERGENCY DEPT VISIT SF MDM: CPT

## 2019-05-11 ASSESSMENT — ENCOUNTER SYMPTOMS
NECK PAIN: 1
NUMBNESS: 0
MYALGIAS: 1
SHORTNESS OF BREATH: 0

## 2019-05-11 ASSESSMENT — MIFFLIN-ST. JEOR: SCORE: 1813.65

## 2019-05-11 NOTE — LETTER
May 11, 2019      To Whom It May Concern:      Drake Wolff was seen in our Emergency Department today, 05/11/19. He may return to work 5/12/19.    Sincerely,        Sabina Garcia MD

## 2019-05-11 NOTE — ED AVS SNAPSHOT
Emergency Department  [address] [facility] [address]  Phone:  [phone]  Fax:  [phone]                                    Drake Wolff   MRN: [id number]    Department:   Emergency Department   Date of Visit:  5/11/2019           After Visit Summary Signature Page    I have received my discharge instructions, and my questions have been answered. I have discussed any challenges I see with this plan with the nurse or doctor.    ..........................................................................................................................................  Patient/Patient Representative Signature      ..........................................................................................................................................  Patient Representative Print Name and Relationship to Patient    ..................................................               ................................................  Date                                   Time    ..........................................................................................................................................  Reviewed by Signature/Title    ...................................................              ..............................................  Date                                               Time          22EPIC Rev 08/18

## 2019-05-11 NOTE — ED PROVIDER NOTES
History     Chief Complaint:  Arm Pain    The history is provided by the patient.      Drake Wolff is a 23 year old male with a history of asthma who presents to the emergency department with his wife for evaluation of arm pain. For the last four days, the patient reports experiencing intermittent sharp pain starting from his right axilla and moving up into the lower right part of his neck when he holds his arm or head a certain way. There are some episodes where the pain feels warm and like needles poking him on the right side of his body. The pain improves when he is at rest, but still has slight pain in his bicep. To note, the patient has had similar episodes to this on his left side with associated intense chest pain. He has been seen multiple times in the ED for it, where they have done ultra sounds, x-rays, and blood work, but the tests have all come back negative. He wanted to go to the urgency room for preventative measures, but they were closed, so he came to the emergency department for further evaluation.     He has a full range of motion, but reports some associated pain with movement. Patient denies any chest pain or shortness of breath. He has no numbness of tingly feeling in his right arm. He denies any recent travels.     Allergies:  No Known Drug Allergies     Medications:    Albuterol  Prednisone    Past Medical History:    Asthma  Allergic rhinitis    Past Surgical History:    Umbilical hernia repair    Family History:    No past pertinent family history.    Social History:  Former tobacco user: Quit date: 01/01/16  Positive for alcohol use.   Negative for drug use.  Marital Status:     Review of Systems   Respiratory: Negative for shortness of breath.    Cardiovascular: Negative for chest pain.   Musculoskeletal: Positive for myalgias (Right bicep) and neck pain.   Neurological: Negative for numbness.   All other systems reviewed and are negative.      Physical Exam     Patient  "Vitals for the past 24 hrs:   BP Temp Temp src Pulse Resp SpO2 Height Weight   05/11/19 0721 127/73 98.2  F (36.8  C) Oral 87 16 99 % 1.727 m (5' 8\") 83.9 kg (185 lb)     Physical Exam  General/Appearance: appears stated age, well-groomed, appears comfortable  Eyes: EOMI, no scleral injection, no icterus  ENT: MMM  Neck: supple, nl ROM, no stiffness  Cardiovascular: RRR, nl S1S2, no m/r/g, 2+ pulses in all 4 extremities, cap refill <2sec  Respiratory: CTAB, good air movement throughout, no wheezes/rhonchi/rales, no increased WOB, no retractions  Back: no lesions  GI: abd soft, non-distended, nttp,  no HSM, no rebound, no guarding, nl BS  MSK: JONES, good tone, no bony abnormality, no reproducible ttp, full ROM of R shoulder and RUE without edema/ttp  Skin: warm and well-perfused, no rash, no edema, no ecchymosis, nl turgor  Neuro: GCS 15, alert and oriented, no gross focal neuro deficits  Psych: interacts appropriately  Heme: no petechia, no purpura, no active bleeding        Emergency Department Course   Emergency Department Course:  Nursing notes and vitals reviewed. 0720 I performed an exam of the patient as documented above.     Findings and plan explained to the Patient and spouse. Patient discharged home with instructions regarding supportive care, medications, and reasons to return. The importance of close follow-up was reviewed. The patient was given referral information for a PCP for further evaluation.     I personally answered all related questions prior to discharge.      Impression & Plan    Medical Decision Making:  This patient is a healthy 23-year-old male who presents with several days of pain that he experiences in his right axilla that goes up to his right neck.  He has a very mild dull component to it chronically however notes intermittent sharp shooting pains with movement of his right shoulder or neck.  He has no chest pain or shortness of breath.  He has had similar pains to the left side and " has had a work-up including EKG, x-rays, blood work which was negative.  He does work as a  so his arms are frequently above his head.  At this point his physical exam is completely benign.  I doubt that this is referred pain from the chest including cardiac or pulmonary.  As he has had x-rays recently a past I do not suggest known tumor I do not think x-rays at this time would be beneficial.  I cannot think of how blood work would be helpful.  It is possible that this is nerve compression versus brachial plexus injury.  Is also possible this is something like a pulled brachial head tendon.  If further work-up is needed it may include MRI.  I do not think this is something emergent that needs to be done in the ER.  We discussed in detail the need for him to establish PCPs for these kind of situation so that they can help coordinate care and any further work-up needed.  He is been given the information and states he will call and try to make an appointment for the next several days.  Diagnosis:    ICD-10-CM    1. Acute pain of right shoulder M25.511        Disposition:  discharged to home with his wife.    Scribe Disclosure:  I, Bisi Salgado, am serving as a scribe on 5/11/2019 at 7:21 AM to personally document services performed by Sabina Garcia* based on my observations and the provider's statements to me.     Bisi Salgado  5/11/2019    EMERGENCY DEPARTMENT       Sabina Garcia MD  05/11/19 0895

## 2020-12-15 NOTE — ED PROVIDER NOTES
History     Chief Complaint:  Asthma (Started this weekend.  SOB )      JOAO Wolff is a 22 year old male with a history of asthma who presents to the emergency department for evaluation of asthma symptoms. The patient developed asthma symptoms about 5 days ago. His symptoms has increased since, with a cough, wheezing, and shortness of breath. The patient also has pain along the left side of his rib cage, from the sternum to the lateral side. The patient has some mild sputum with no hemoptysis. The patient has used an inhaler, which has only provided brief relief of his symptoms. He doesn't have a fever.     Allergies:  No Known Allergies     Medications:    Albuterol     Past Medical History:    The patient denies any significant past medical history.    Past Surgical History:    The patient does not have any pertinent past surgical history.    Family History:    No past pertinent family history.    Social History:  Marital Status:    Smoking status: Former Smoker  Alcohol use: Yes       Review of Systems   Constitutional: Negative for fever.   Respiratory: Positive for cough, chest tightness, shortness of breath and wheezing.    All other systems reviewed and are negative.        Physical Exam   First Vitals:  BP: 134/82  Pulse: 114  Temp: 99.1  F (37.3  C)  Resp: 22  SpO2: 96 %      Physical Exam  SKIN:  Warm, dry.  HEMATOLOGIC/IMMUNOLOGIC/LYMPHATIC:  No pallor.  No edema.  HENT:  Normal phonation.  No stridor.  EYES:  Conjunctivae normal.  CARDIOVASCULAR:  Tachycardic rate and regular rhythm.  No murmur.  No rub.  RESPIRATORY:  No respiratory distress, breath sounds equal with diffuse inspiratory and expiratory wheezes.  GASTROINTESTINAL:  Soft, nontender abdomen.  MUSCULOSKELETAL:  Normal body habitus.  NEUROLOGIC:  Alert, conversant.  PSYCHIATRIC:  Normal mood.    Emergency Department Course   ECG:  Indication: Asthma  Time: 0928  Vent. Rate 101 bpm. MI interval 154. QRS duration 76.  Patient updated. QT/QTc 336/435. P-R-T axis 73 64 34. Sinus tachycardia. Septal infarct, age undetermined. Abnormal ECG.   Read time: 0938      Imaging:  Radiographic findings were communicated with the patient who voiced understanding of the findings.  XR Chest 2 views:   Normal. As per radiology.     Interventions:  0855 Albuterol 5 mg nebulization      Emergency Department Course:  Nursing notes and vitals reviewed.     0840 I performed an exam of the patient as documented above.     EKG obtained in the ED, see results above.     The patient was sent for a chest XR while in the emergency department, findings above.     0945 I rechecked the patient and discussed the results of his workup thus far.     Findings and plan explained to the patient. Patient discharged home with instructions regarding supportive care, medications, and reasons to return. The importance of close follow-up was reviewed. The patient was prescribed albuterol and prednisone.    I personally reviewed the laboratory results with the patient and answered all related questions prior to discharge.         Impression & Plan      Medical Decision Making:  Drake Wolff is a 22 year old male who presents to the emergency department with symptoms and an examination consistent with an asthma exacerbation. Given his chest discomfort, testing performed as above. He lacks fever. He improved with treatment, specifically nebulized albuterol. He found this was more helpful than his inhaler so he was dispensed a nebulizer and I prescribed nebulized albuterol and prednisone. I advised to return if worsening or new concerns.      Diagnosis:    ICD-10-CM   1. Moderate asthma with exacerbation, unspecified whether persistent J45.901       Disposition:  Discharged to home.    Discharge Medications:  New Prescriptions    ALBUTEROL (2.5 MG/3ML) 0.083% NEB SOLUTION    Take 1 vial (2.5 mg) by nebulization every 6 hours as needed for shortness of breath / dyspnea or wheezing     PREDNISONE (DELTASONE) 20 MG TABLET    Take two tablets (= 40mg) each day for 5 (five) days     I, Ar Perez, am serving as a scribe on 4/11/2018 at 8:40 AM to personally document services performed by Randell Mckeon MD based on my observations and the provider's statements to me.       Ar Perez  4/11/2018    EMERGENCY DEPARTMENT       Randell Mckeon MD  04/11/18 6299

## 2021-10-11 ENCOUNTER — HOSPITAL ENCOUNTER (EMERGENCY)
Facility: CLINIC | Age: 26
Discharge: HOME | End: 2021-10-11
Attending: EMERGENCY MEDICINE | Admitting: EMERGENCY MEDICINE
Payer: COMMERCIAL

## 2021-10-11 VITALS
DIASTOLIC BLOOD PRESSURE: 97 MMHG | TEMPERATURE: 98.3 F | OXYGEN SATURATION: 100 % | HEART RATE: 85 BPM | RESPIRATION RATE: 18 BRPM | SYSTOLIC BLOOD PRESSURE: 144 MMHG

## 2021-10-11 DIAGNOSIS — J45.901 EXACERBATION OF ASTHMA, UNSPECIFIED ASTHMA SEVERITY, UNSPECIFIED WHETHER PERSISTENT: Primary | ICD-10-CM

## 2021-10-11 PROCEDURE — 94640 AIRWAY INHALATION TREATMENT: CPT

## 2021-10-11 PROCEDURE — 250N000013 HC RX MED GY IP 250 OP 250 PS 637: Performed by: EMERGENCY MEDICINE

## 2021-10-11 PROCEDURE — 250N000012 HC RX MED GY IP 250 OP 636 PS 637: Performed by: EMERGENCY MEDICINE

## 2021-10-11 PROCEDURE — 99283 EMERGENCY DEPT VISIT LOW MDM: CPT | Mod: 25

## 2021-10-11 RX ORDER — ALBUTEROL SULFATE 90 UG/1
2 AEROSOL, METERED RESPIRATORY (INHALATION) EVERY 6 HOURS PRN
Status: DISCONTINUED | OUTPATIENT
Start: 2021-10-11 | End: 2021-10-11 | Stop reason: HOSPADM

## 2021-10-11 RX ORDER — PREDNISONE 20 MG/1
60 TABLET ORAL ONCE
Status: COMPLETED | OUTPATIENT
Start: 2021-10-11 | End: 2021-10-11

## 2021-10-11 RX ORDER — ALBUTEROL SULFATE 90 UG/1
2-4 AEROSOL, METERED RESPIRATORY (INHALATION)
Qty: 18 G | Refills: 0 | Status: SHIPPED | OUTPATIENT
Start: 2021-10-11

## 2021-10-11 RX ORDER — PREDNISONE 20 MG/1
TABLET ORAL
Qty: 10 TABLET | Refills: 0 | Status: SHIPPED | OUTPATIENT
Start: 2021-10-11

## 2021-10-11 RX ADMIN — PREDNISONE 60 MG: 20 TABLET ORAL at 07:18

## 2021-10-11 RX ADMIN — ALBUTEROL SULFATE 2 PUFF: 90 AEROSOL, METERED RESPIRATORY (INHALATION) at 07:20

## 2021-10-11 ASSESSMENT — ENCOUNTER SYMPTOMS
SHORTNESS OF BREATH: 1
WHEEZING: 1
VOMITING: 0
COUGH: 0
FEVER: 0
ABDOMINAL PAIN: 0

## 2021-10-11 NOTE — ED PROVIDER NOTES
History   Chief Complaint:  Asthma     HPI   Drake Wolff is a 26 year old male with history of asthma who presents with asthma exacerbation. He states that he used his Albuterol inhaler about 4 times yesterday and ran out of this about 8 hours ago. Notes intermittent coughing as well. Mentions that he uses virtual doctors and does not have an established PCP. Denies fever, chest pain, vomiting, or abdominal pain. He is not Covid vaccinated.     Review of Systems   Constitutional: Negative for fever.   Respiratory: Positive for shortness of breath and wheezing. Negative for cough.    Cardiovascular: Negative for chest pain.   Gastrointestinal: Negative for abdominal pain and vomiting.   All other systems reviewed and are negative.    Allergies:  The patient has no known allergies.     Medications:  Albuterol    Past Medical History:     Asthma    Past Surgical History:    Umbilical hernia repair    Social History:  The patient presents to the ED alone.  Smoker.  Works in construction.     Physical Exam     Patient Vitals for the past 24 hrs:   BP Temp Temp src Pulse Resp SpO2   10/11/21 0718 -- 98.3  F (36.8  C) Oral -- -- --   10/11/21 0643 (!) 144/97 -- -- 85 18 100 %     Physical Exam  General: Alert, appears well-developed and well-nourished. Cooperative.     In mild distress  HEENT:  Head:  Atraumatic  Ears:  External ears are normal  Mouth/Throat:  Oropharynx is without erythema or exudate and mucous membranes are moist.   Eyes:   Conjunctivae normal and EOM are normal. No scleral icterus.    Pupils are equal, round, and reactive to light.   Neck:   Normal range of motion. Neck supple.  CV:  Normal rate, regular rhythm, normal heart sounds and radial pulses are 2+ and symmetric.  No murmur.  Resp:  Breath sounds are coarse bilaterally, expiratory wheezes faint in upper lobes bilaterally.     Non-labored, no retractions or accessory muscle use  GI:  Abdomen is soft, no distension, no tenderness. No  rebound or guarding. No CVA tenderness bilaterally  MS:  Normal range of motion. No edema.    Normal strength in all 4 extremities.     Back atraumatic.    No midline cervical, thoracic, or lumbar tenderness  Skin:  Warm and dry.  No rash or lesions noted.  Neuro: Alert. Normal strength.  Sensation intact in all 4 extremities. GCS: 15  Psych:  Normal mood and affect.    Emergency Department Course     Emergency Department Course:  Reviewed:  I reviewed nursing notes, vitals, past medical history and Care Everywhere    Assessments:  0704 I obtained history and examined the patient as noted above.     Interventions:  0718 Deltasone 60 mg PO  0720 Albuterol 2 puffs Inhalation    Disposition:  The patient was discharged to home.     Impression & Plan     Medical Decision Making:  Drake Wolff is a 26 year old male who presents for evaluation of shortness of breath and wheezing, consistent with known reactive airway disease.  Signs and symptoms are consistent with asthma exacerbation.  A broad differential was considered including inhaled foreign body, asthma, pneumonia, bronchitis, COPD, pneumothorax, cardiac equivalent, viral induced wheezing, allergic phenomena, etc.  The patient was treated with Albuterol and steroids and feels improved after interventions here in ED.      The patient has not had multiple recent ED visits for these symptoms, has not had recent steroid burst, no recent asthma related admission, and has no history of intubation for asthma.  No indication for hospitalization at this time including no hypoxia, no marked increase in respiratory rate, minimal to no retractions. Supportive outpatient management is indicated, medications for discharge noted above.  Close followup with primary care physician.  Return if increased wheezing, progressive shortness of breath, develops fever greater than 102.       Diagnosis:    ICD-10-CM    1. Exacerbation of asthma, unspecified asthma severity, unspecified  whether persistent  J45.901        Discharge Medications:  Discharge Medication List as of 10/11/2021  7:28 AM      START taking these medications    Details   !! albuterol (PROAIR HFA/PROVENTIL HFA/VENTOLIN HFA) 108 (90 Base) MCG/ACT inhaler Inhale 2-4 puffs into the lungs every 2 hours as needed for shortness of breath / dyspnea, Disp-18 g, R-0, Local PrintWITH SPACER       !! - Potential duplicate medications found. Please discuss with provider.          Scribe Disclosure:  I, Иван Virk, am serving as a scribe at 6:47 AM on 10/11/2021 to document services personally performed by Golden Vann MD based on my observations and the provider's statements to me.           Golden Vann MD  10/11/21 0752